# Patient Record
Sex: FEMALE | Race: WHITE
[De-identification: names, ages, dates, MRNs, and addresses within clinical notes are randomized per-mention and may not be internally consistent; named-entity substitution may affect disease eponyms.]

---

## 2019-04-09 PROBLEM — Z00.00 ENCOUNTER FOR PREVENTIVE HEALTH EXAMINATION: Status: ACTIVE | Noted: 2019-04-09

## 2019-04-23 ENCOUNTER — APPOINTMENT (OUTPATIENT)
Dept: INTERNAL MEDICINE | Facility: CLINIC | Age: 64
End: 2019-04-23
Payer: COMMERCIAL

## 2019-04-23 VITALS
HEIGHT: 72 IN | WEIGHT: 202 LBS | SYSTOLIC BLOOD PRESSURE: 134 MMHG | DIASTOLIC BLOOD PRESSURE: 74 MMHG | TEMPERATURE: 99.1 F | BODY MASS INDEX: 27.36 KG/M2 | HEART RATE: 76 BPM

## 2019-04-23 DIAGNOSIS — I87.1 COMPRESSION OF VEIN: ICD-10-CM

## 2019-04-23 DIAGNOSIS — Z78.9 OTHER SPECIFIED HEALTH STATUS: ICD-10-CM

## 2019-04-23 DIAGNOSIS — Z85.3 PERSONAL HISTORY OF MALIGNANT NEOPLASM OF BREAST: ICD-10-CM

## 2019-04-23 PROCEDURE — 99243 OFF/OP CNSLTJ NEW/EST LOW 30: CPT

## 2019-04-23 RX ORDER — IBUPROFEN 600 MG/1
600 TABLET, FILM COATED ORAL
Refills: 0 | Status: ACTIVE | COMMUNITY
Start: 2019-04-23

## 2019-04-23 NOTE — HISTORY OF PRESENT ILLNESS
[No Pertinent Cardiac History] : no history of aortic stenosis, atrial fibrillation, coronary artery disease, recent myocardial infarction, or implantable device/pacemaker [No Pertinent Pulmonary History] : no history of asthma, COPD, sleep apnea, or smoking [No Adverse Anesthesia Reaction] : no adverse anesthesia reaction in self or family member [Chronic Anticoagulation] : no chronic anticoagulation [Chronic Kidney Disease] : no chronic kidney disease [Diabetes] : no diabetes [(Patient denies any chest pain, claudication, dyspnea on exertion, orthopnea, palpitations or syncope)] : Patient denies any chest pain, claudication, dyspnea on exertion, orthopnea, palpitations or syncope [Good (7-10 METs)] : Good (7-10 METs) [FreeTextEntry1] : L THR [FreeTextEntry2] : 5//8/19 [FreeTextEntry3] : Dr Pugh [FreeTextEntry4] : Dear Dr. Pugh : Thank you for referring Ms. BLAKE for preoperative evaluation prior to L THR on 5/8/19.  She  is complaining of L hip  pain for2  years getting worse in the last 6 months. Anti-inflammatories and physical therapy are not helping any longer.\par \par no PCP.

## 2019-04-23 NOTE — ASSESSMENT
[Patient Optimized for Surgery] : Patient optimized for surgery [As per surgery] : as per surgery [No Further Testing Recommended] : no further testing recommended [FreeTextEntry4] : Ms. BLAKE is a 63 year yo female with no h/o CAD and good exercise tolerance. She denies CP, palpitation or SOB and her EKG today as normal. She does not take blood thinners and denies sleep apnea or urinary obstruction symptoms. 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 She will continue the NSAIDs as instructed. The morning of the procedure she will not take any vitamins or NSAIDs.\par Ms. BLAKE has a moderate risk for perioperative cardiovascular complications and will undergo the procedure as planned. I will follow her postop.\par \par  ***More than 50% of the face to face time was devoted to counseling and/or coordination of care. The discussion and/or coordination of care included: perioperative medication management.\par \par

## 2019-04-23 NOTE — PHYSICAL EXAM
[Normal] : normal gait, coordination grossly intact, no focal deficits [de-identified] : L hip c limited and tender ROM

## 2019-04-23 NOTE — REVIEW OF SYSTEMS
[Negative] : Heme/Lymph [Joint Pain] : joint pain [Joint Stiffness] : joint stiffness [FreeTextEntry9] : L hip see HPI

## 2019-06-10 ENCOUNTER — APPOINTMENT (OUTPATIENT)
Dept: INTERNAL MEDICINE | Facility: CLINIC | Age: 64
End: 2019-06-10
Payer: COMMERCIAL

## 2019-06-10 VITALS
DIASTOLIC BLOOD PRESSURE: 72 MMHG | SYSTOLIC BLOOD PRESSURE: 122 MMHG | HEIGHT: 72 IN | TEMPERATURE: 98.9 F | BODY MASS INDEX: 27.09 KG/M2 | WEIGHT: 200 LBS | HEART RATE: 80 BPM

## 2019-06-10 DIAGNOSIS — Z01.818 ENCOUNTER FOR OTHER PREPROCEDURAL EXAMINATION: ICD-10-CM

## 2019-06-10 DIAGNOSIS — M16.12 UNILATERAL PRIMARY OSTEOARTHRITIS, LEFT HIP: ICD-10-CM

## 2019-06-10 DIAGNOSIS — R09.81 NASAL CONGESTION: ICD-10-CM

## 2019-06-10 DIAGNOSIS — R06.83 SNORING: ICD-10-CM

## 2019-06-10 PROCEDURE — 99214 OFFICE O/P EST MOD 30 MIN: CPT | Mod: 25

## 2019-06-10 PROCEDURE — 36415 COLL VENOUS BLD VENIPUNCTURE: CPT

## 2019-06-11 LAB
ALBUMIN SERPL ELPH-MCNC: 4.3 G/DL
ALP BLD-CCNC: 111 U/L
ALT SERPL-CCNC: 12 U/L
ANION GAP SERPL CALC-SCNC: 13 MMOL/L
AST SERPL-CCNC: 12 U/L
BASOPHILS # BLD AUTO: 0.03 K/UL
BASOPHILS NFR BLD AUTO: 0.6 %
BILIRUB SERPL-MCNC: 0.3 MG/DL
BUN SERPL-MCNC: 18 MG/DL
CALCIUM SERPL-MCNC: 9.8 MG/DL
CHLORIDE SERPL-SCNC: 104 MMOL/L
CO2 SERPL-SCNC: 22 MMOL/L
CREAT SERPL-MCNC: 0.78 MG/DL
EOSINOPHIL # BLD AUTO: 0.15 K/UL
EOSINOPHIL NFR BLD AUTO: 2.9 %
GLUCOSE SERPL-MCNC: 109 MG/DL
HCT VFR BLD CALC: 37.3 %
HGB BLD-MCNC: 11.3 G/DL
IMM GRANULOCYTES NFR BLD AUTO: 0.2 %
LYMPHOCYTES # BLD AUTO: 1.17 K/UL
LYMPHOCYTES NFR BLD AUTO: 22.8 %
MAN DIFF?: NORMAL
MCHC RBC-ENTMCNC: 28.6 PG
MCHC RBC-ENTMCNC: 30.3 GM/DL
MCV RBC AUTO: 94.4 FL
MONOCYTES # BLD AUTO: 0.48 K/UL
MONOCYTES NFR BLD AUTO: 9.4 %
NEUTROPHILS # BLD AUTO: 3.29 K/UL
NEUTROPHILS NFR BLD AUTO: 64.1 %
PLATELET # BLD AUTO: 308 K/UL
POTASSIUM SERPL-SCNC: 4.2 MMOL/L
PROT SERPL-MCNC: 6.7 G/DL
RBC # BLD: 3.95 M/UL
RBC # FLD: 14.4 %
SODIUM SERPL-SCNC: 139 MMOL/L
WBC # FLD AUTO: 5.13 K/UL

## 2019-06-12 NOTE — REVIEW OF SYSTEMS
[Negative] : Genitourinary [Nasal Discharge] : no nasal discharge [Sore Throat] : no sore throat [Joint Pain] : no joint pain [Joint Stiffness] : no joint stiffness [FreeTextEntry4] : sinus congestion - chronic

## 2019-06-12 NOTE — ASSESSMENT
[As per surgery] : as per surgery [No Further Testing Recommended] : no further testing recommended [Patient Optimized for Surgery] : Patient optimized for surgery [FreeTextEntry4] : Ms. BLAKE is a 63 year yo female with no h/o CAD and good exercise tolerance. She denies CP, palpitation or SOB and her EKG is normal. She does not take blood thinners and denies any  symptoms. 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  The morning of the procedure she will not take any vitamins or NSAIDs.\par Ms. BLAKE has a low risk for perioperative cardiovascular complications and will undergo the procedure as planned. \par \par  ***More than 50% of the face to face time was devoted to counseling and/or coordination of care. The discussion and/or coordination of care included: perioperative medication management.\par \par

## 2019-06-12 NOTE — HISTORY OF PRESENT ILLNESS
[No Pertinent Pulmonary History] : no history of asthma, COPD, sleep apnea, or smoking [No Pertinent Cardiac History] : no history of aortic stenosis, atrial fibrillation, coronary artery disease, recent myocardial infarction, or implantable device/pacemaker [No Adverse Anesthesia Reaction] : no adverse anesthesia reaction in self or family member [(Patient denies any chest pain, claudication, dyspnea on exertion, orthopnea, palpitations or syncope)] : Patient denies any chest pain, claudication, dyspnea on exertion, orthopnea, palpitations or syncope [Good (7-10 METs)] : Good (7-10 METs) [Chronic Anticoagulation] : no chronic anticoagulation [Chronic Kidney Disease] : no chronic kidney disease [FreeTextEntry1] : septoplasty/sinus surgey [Diabetes] : no diabetes [FreeTextEntry2] : 6/14/19 [FreeTextEntry3] : Dr Chaudhary [FreeTextEntry4] : Dear Dr. Chaudhary : Thank you for referring Ms. BLAKE for preoperative evaluation prior to sinus surgery on June 14.  She  is complaining of sinus congestion  and snoring for many years. \par \par no PCP.